# Patient Record
Sex: FEMALE | Race: BLACK OR AFRICAN AMERICAN
[De-identification: names, ages, dates, MRNs, and addresses within clinical notes are randomized per-mention and may not be internally consistent; named-entity substitution may affect disease eponyms.]

---

## 2017-10-13 ENCOUNTER — HOSPITAL ENCOUNTER (EMERGENCY)
Dept: HOSPITAL 61 - ER | Age: 32
LOS: 1 days | Discharge: HOME | End: 2017-10-14
Payer: COMMERCIAL

## 2017-10-13 VITALS — SYSTOLIC BLOOD PRESSURE: 134 MMHG | DIASTOLIC BLOOD PRESSURE: 72 MMHG

## 2017-10-13 VITALS — BODY MASS INDEX: 32.14 KG/M2 | WEIGHT: 200 LBS | HEIGHT: 66 IN

## 2017-10-13 DIAGNOSIS — M25.562: ICD-10-CM

## 2017-10-13 DIAGNOSIS — M25.511: Primary | ICD-10-CM

## 2017-10-13 PROCEDURE — 99283 EMERGENCY DEPT VISIT LOW MDM: CPT

## 2017-10-14 NOTE — PHYS DOC
Past Medical History


Past Medical History:  No Pertinent History


Past Surgical History:  No Surgical History


Alcohol Use:  None


Drug Use:  None





Adult General


Chief Complaint


Chief Complaint:  MULTIPLE COMPLAINTS





HPI


HPI





Patient is a 32  year old female who presents with mild to moderate right 

shoulder pain and left knee pain that began a couple days ago with no known 

injury. Patient states the pain on the left knee is worse on ambulation and 

pain on the right shoulder is worse on range of motion.





Review of Systems


Review of Systems





Constitutional: Denies fever or chills []


Musculoskeletal:  right shoulder pain and left knee pain


Integument: Denies rash or skin lesions []


Neurologic: Denies headache, focal weakness or sensory changes []





Physical Exam


Physical Exam





Constitutional: Well developed, well nourished, no acute distress, non-toxic 

appearance. []


Skin: Warm, dry, no erythema, no rash. [] 


Back: No tenderness, no CVA tenderness. [] 


Extremities: Overweight patient. Right shoulder and left knee with no 

deformity. No tenderness on palpation of the left knee of the right shoulder. 

Full active as well as passive range of motion to the left knee and right 

shoulder. Negative Lachman sign and negative Kary's sign negative anterior-

posterior drawer sign to the left knee. +2 left pedal pulse. Cap refill less 

than 2 seconds the left lower extremity. Adequate abduction and adduction of 

the right shoulder. Adequate plantar flexion and dorsiflexion of the right 

forearm. +2 right radial pulse. Adequate radial medial and ulnar sensation to 

the right upper extremity.


Neurologic: Alert and oriented X 3, normal motor function, normal sensory 

function, no focal deficits noted. []


Psychologic: Affect normal, judgement normal, mood normal. []





Current Patient Data


Vital Signs





 Vital Signs








  Date Time  Temp Pulse Resp B/P (MAP) Pulse Ox O2 Delivery O2 Flow Rate FiO2


 


10/13/17 23:36 98.4 80 14  96 Room Air  





 98.4       











EKG


EKG


[]





Radiology/Procedures


Radiology/Procedures


[]





Course & Med Decision Making


Course & Med Decision Making


Pertinent Labs and Imaging studies reviewed. (See chart for details)





Patient is in the ED with complaints of right shoulder pain and left knee pain 

with no known injury. Pain appears musculoskeletal, probably arthritis. Will 

discharged with diclofenac Flexeril and Medrol Dosepak. Follow-up with 

orthopedic doctor in one week.





Dragon Disclaimer


Dragon Disclaimer


This electronic medical record was generated, in whole or in part, using a 

voice recognition dictation system.





Departure


Departure


Impression:  


 Primary Impression:  


 Left knee pain


 Additional Impression:  


 Right shoulder pain


Disposition:  01 HOME, SELF-CARE


Condition:  STABLE


Referrals:  


NO PCP (PCP)








JOAQUINA DOLAN II, MD


Follow up in one week if pain continues


Patient Instructions:  Knee Pain, Easy-to-Read, Shoulder Pain





Additional Instructions:  


You were seen for left knee pain and right shoulder pain. Ice elevate the 

affected extremities. Follow-up with the provided orthopedic doctor in one week 

if pain continues. Take the prescribed medicines as ordered.


Scripts


Methylprednisolone (MEDROL) 4 Mg Tab.ds.pk


1 PKG PO UD, #1 PKG


   Prov: PRANAV LONG         10/14/17 


Cyclobenzaprine Hcl (CYCLOBENZAPRINE HCL) 10 Mg Tablet


1 TAB PO TID, #30 TAB


   Prov: PRANAV LONG         10/14/17 


Diclofenac Sodium (DICLOFENAC SODIUM) 50 Mg Tablet.dr


1 TAB PO BID, #30 TAB 0 Refills


   Prov: PRANAV LONG         10/14/17





Problem Qualifiers








 Primary Impression:  


 Left knee pain


 Chronicity:  acute  Qualified Codes:  M25.562 - Pain in left knee


 Additional Impression:  


 Right shoulder pain


 Chronicity:  acute  Qualified Codes:  M25.511 - Pain in right shoulder








PRANAV LONG Oct 14, 2017 00:17

## 2018-01-22 ENCOUNTER — HOSPITAL ENCOUNTER (EMERGENCY)
Dept: HOSPITAL 61 - ER | Age: 33
Discharge: HOME | End: 2018-01-22
Payer: COMMERCIAL

## 2018-01-22 DIAGNOSIS — H10.89: ICD-10-CM

## 2018-01-22 DIAGNOSIS — H10.13: Primary | ICD-10-CM

## 2018-01-22 PROCEDURE — 99283 EMERGENCY DEPT VISIT LOW MDM: CPT

## 2018-01-27 ENCOUNTER — HOSPITAL ENCOUNTER (EMERGENCY)
Dept: HOSPITAL 61 - ER | Age: 33
Discharge: HOME | End: 2018-01-27
Payer: COMMERCIAL

## 2018-01-27 DIAGNOSIS — N39.0: Primary | ICD-10-CM

## 2018-01-27 LAB
ADD MAN DIFF?: NO
ALBUMIN SERPL-MCNC: 3.7 G/DL (ref 3.4–5)
ALBUMIN/GLOB SERPL: 0.9 {RATIO} (ref 1–1.7)
ALP SERPL-CCNC: 67 U/L (ref 46–116)
ALT (SGPT): 28 U/L (ref 14–59)
ANION GAP SERPL CALC-SCNC: 10 MMOL/L (ref 6–14)
AST SERPL-CCNC: 18 U/L (ref 15–37)
BACTERIA,URINE: (no result) /HPF
BASO #: 0 X10^3/UL (ref 0–0.2)
BASO %: 1 % (ref 0–3)
BILIRUBIN,URINE: NEGATIVE
BLOOD UREA NITROGEN: 12 MG/DL (ref 7–20)
BUN/CREAT SERPL: 12 (ref 6–20)
CALCIUM: 8.9 MG/DL (ref 8.5–10.1)
CHLORIDE: 102 MMOL/L (ref 98–107)
CLARITY,URINE: CLEAR
CO2 SERPL-SCNC: 28 MMOL/L (ref 21–32)
COLOR,URINE: YELLOW
CREAT SERPL-MCNC: 1 MG/DL (ref 0.6–1)
EOS #: 0.3 X10^3/UL (ref 0–0.7)
EOS %: 5 % (ref 0–3)
GFR SERPLBLD BASED ON 1.73 SQ M-ARVRAT: 78.8 ML/MIN
GLOBULIN SER-MCNC: 4.1 G/DL (ref 2.2–3.8)
GLUCOSE SERPL-MCNC: 112 MG/DL (ref 70–99)
GLUCOSE,URINE: NEGATIVE MG/DL
HCG SERPL-ACNC: 7.3 X10^3/UL (ref 4–11)
HEMATOCRIT: 35 % (ref 36–47)
HEMOGLOBIN: 11.7 G/DL (ref 12–15.5)
HYALINE CASTS, URINE: (no result) /HPF
INFLUENZA A PATIENT: NEGATIVE
INFLUENZA B PATIENT: NEGATIVE
LYMPH #: 3.7 X10^3/UL (ref 1–4.8)
LYMPH %: 51 % (ref 24–48)
MEAN CORPUSCULAR HEMOGLOBIN: 28 PG (ref 25–35)
MEAN CORPUSCULAR HGB CONC: 33 G/DL (ref 31–37)
MEAN CORPUSCULAR VOLUME: 84 FL (ref 79–100)
MONO #: 0.7 X10^3/UL (ref 0–1.1)
MONO %: 9 % (ref 0–9)
NEUT #: 2.5 X10^3UL (ref 1.8–7.7)
NEUT %: 34 % (ref 31–73)
NITRITE,URINE: NEGATIVE
OBC FLU: (no result)
PH,URINE: 5.5
PLATELET COUNT: 304 X10^3/UL (ref 140–400)
POTASSIUM SERPL-SCNC: 3.6 MMOL/L (ref 3.5–5.1)
PROTEIN,URINE: NEGATIVE MG/DL
RED BLOOD COUNT: 4.19 X10^6/UL (ref 3.5–5.4)
RED CELL DISTRIBUTION WIDTH: 13.8 % (ref 11.5–14.5)
SODIUM: 140 MMOL/L (ref 136–145)
SPECIFIC GRAVITY,URINE: >=1.03
SQUAMOUS EPITHELIAL CELL,UR: (no result) /LPF
TOTAL BILIRUBIN: 0.2 MG/DL (ref 0.2–1)
TOTAL PROTEIN: 7.8 G/DL (ref 6.4–8.2)
URINE HCG POC: (no result)
UROBILINOGEN,URINE: 0.2 MG/DL
WBC,URINE: >40 /HPF (ref 0–4)

## 2018-01-27 PROCEDURE — 96361 HYDRATE IV INFUSION ADD-ON: CPT

## 2018-01-27 PROCEDURE — 81001 URINALYSIS AUTO W/SCOPE: CPT

## 2018-01-27 PROCEDURE — 99284 EMERGENCY DEPT VISIT MOD MDM: CPT

## 2018-01-27 PROCEDURE — 80053 COMPREHEN METABOLIC PANEL: CPT

## 2018-01-27 PROCEDURE — 85025 COMPLETE CBC W/AUTO DIFF WBC: CPT

## 2018-01-27 PROCEDURE — 96374 THER/PROPH/DIAG INJ IV PUSH: CPT

## 2018-01-27 PROCEDURE — 87804 INFLUENZA ASSAY W/OPTIC: CPT

## 2018-01-27 PROCEDURE — 87086 URINE CULTURE/COLONY COUNT: CPT

## 2018-01-27 PROCEDURE — 36415 COLL VENOUS BLD VENIPUNCTURE: CPT

## 2018-01-27 PROCEDURE — 81025 URINE PREGNANCY TEST: CPT

## 2018-01-27 RX ADMIN — BACITRACIN 1 MLS/HR: 5000 INJECTION, POWDER, FOR SOLUTION INTRAMUSCULAR at 03:10

## 2018-01-27 RX ADMIN — ONDANSETRON 1 MG: 2 INJECTION INTRAMUSCULAR; INTRAVENOUS at 03:05
